# Patient Record
Sex: MALE | Race: WHITE | URBAN - METROPOLITAN AREA
[De-identification: names, ages, dates, MRNs, and addresses within clinical notes are randomized per-mention and may not be internally consistent; named-entity substitution may affect disease eponyms.]

---

## 2019-10-10 ENCOUNTER — APPOINTMENT (OUTPATIENT)
Dept: URBAN - METROPOLITAN AREA CLINIC 232 | Age: 59
Setting detail: DERMATOLOGY
End: 2019-10-11

## 2019-10-10 DIAGNOSIS — L91.8 OTHER HYPERTROPHIC DISORDERS OF THE SKIN: ICD-10-CM

## 2019-10-10 DIAGNOSIS — L663 OTHER SPECIFIED DISEASES OF HAIR AND HAIR FOLLICLES: ICD-10-CM

## 2019-10-10 DIAGNOSIS — L738 OTHER SPECIFIED DISEASES OF HAIR AND HAIR FOLLICLES: ICD-10-CM

## 2019-10-10 DIAGNOSIS — L82.1 OTHER SEBORRHEIC KERATOSIS: ICD-10-CM

## 2019-10-10 PROBLEM — L02.221 FURUNCLE OF ABDOMINAL WALL: Status: ACTIVE | Noted: 2019-10-10

## 2019-10-10 PROCEDURE — 99213 OFFICE O/P EST LOW 20 MIN: CPT | Mod: 25

## 2019-10-10 PROCEDURE — OTHER COUNSELING: OTHER

## 2019-10-10 PROCEDURE — OTHER SKIN TAG REMOVAL: OTHER

## 2019-10-10 PROCEDURE — 11200 RMVL SKIN TAGS UP TO&INC 15: CPT

## 2019-10-10 PROCEDURE — OTHER PRESCRIPTION: OTHER

## 2019-10-10 RX ORDER — CLINDAMYCIN PHOSPHATE 10 MG/ML
LOTION TOPICAL
Qty: 60 | Refills: 3 | Status: ERX | COMMUNITY
Start: 2019-10-10

## 2019-10-10 ASSESSMENT — LOCATION ZONE DERM
LOCATION ZONE: AXILLAE
LOCATION ZONE: TRUNK
LOCATION ZONE: EYELID
LOCATION ZONE: FACE

## 2019-10-10 ASSESSMENT — LOCATION DETAILED DESCRIPTION DERM
LOCATION DETAILED: RIGHT SUPERIOR MEDIAL UPPER BACK
LOCATION DETAILED: LEFT SUPERIOR CENTRAL MALAR CHEEK
LOCATION DETAILED: RIGHT LATERAL EYEBROW
LOCATION DETAILED: RIGHT AXILLARY VAULT
LOCATION DETAILED: LEFT RIB CAGE
LOCATION DETAILED: RIGHT SUPERIOR CENTRAL MALAR CHEEK
LOCATION DETAILED: RIGHT LATERAL ABDOMEN
LOCATION DETAILED: LEFT LATERAL CANTHUS

## 2019-10-10 ASSESSMENT — LOCATION SIMPLE DESCRIPTION DERM
LOCATION SIMPLE: RIGHT EYEBROW
LOCATION SIMPLE: RIGHT AXILLARY VAULT
LOCATION SIMPLE: LEFT CHEEK
LOCATION SIMPLE: RIGHT UPPER BACK
LOCATION SIMPLE: ABDOMEN
LOCATION SIMPLE: RIGHT CHEEK
LOCATION SIMPLE: LEFT EYELID

## 2019-10-10 NOTE — PROCEDURE: SKIN TAG REMOVAL
Include Z78.9 (Other Specified Conditions Influencing Health Status) As An Associated Diagnosis?: No
Medical Necessity Information: It is in your best interest to select a reason for this procedure from the list below. All of these items fulfill various CMS LCD requirements except the new and changing color options.
Add Associated Diagnoses If Applicable When Selecting Medical Necessity: Yes
Consent: Written consent obtained and the risks of skin tag removal was reviewed with the patient including but not limited to bleeding, pigmentary change, infection, pain, and remote possibility of scarring. Pt is also aware that this procedure may not be covered by insurance in which case, it will out of pocket cost. for patient.
Anesthesia Volume In Cc: 0.2
Medical Necessity Clause: This procedure was medically necessary because the lesions that were treated were:
Detail Level: Detailed
Hemostasis: Electrocautery and Aluminum Chloride
Anesthesia Type: 1% lidocaine with epinephrine and a 1:10 solution of 8.4% sodium bicarbonate

## 2019-10-10 NOTE — HPI: RASH
What Type Of Note Output Would You Prefer (Optional)?: Standard Output
How Severe Is Your Rash?: mild
Is This A New Presentation, Or A Follow-Up?: Follow Up Rash
Additional History: Pt states the rash

## 2020-12-30 ENCOUNTER — APPOINTMENT (OUTPATIENT)
Dept: URBAN - METROPOLITAN AREA CLINIC 232 | Age: 60
Setting detail: DERMATOLOGY
End: 2020-12-31

## 2020-12-30 DIAGNOSIS — L30.8 OTHER SPECIFIED DERMATITIS: ICD-10-CM

## 2020-12-30 DIAGNOSIS — L82.1 OTHER SEBORRHEIC KERATOSIS: ICD-10-CM

## 2020-12-30 DIAGNOSIS — D22 MELANOCYTIC NEVI: ICD-10-CM

## 2020-12-30 DIAGNOSIS — L81.4 OTHER MELANIN HYPERPIGMENTATION: ICD-10-CM

## 2020-12-30 DIAGNOSIS — D18.0 HEMANGIOMA: ICD-10-CM

## 2020-12-30 PROBLEM — D18.01 HEMANGIOMA OF SKIN AND SUBCUTANEOUS TISSUE: Status: ACTIVE | Noted: 2020-12-30

## 2020-12-30 PROBLEM — D22.5 MELANOCYTIC NEVI OF TRUNK: Status: ACTIVE | Noted: 2020-12-30

## 2020-12-30 PROCEDURE — OTHER COUNSELING: OTHER

## 2020-12-30 PROCEDURE — OTHER PRESCRIPTION: OTHER

## 2020-12-30 PROCEDURE — 99213 OFFICE O/P EST LOW 20 MIN: CPT

## 2020-12-30 RX ORDER — TRIAMCINOLONE ACETONIDE 1 MG/G
CREAM TOPICAL BID
Qty: 1 | Refills: 1 | Status: ERX | COMMUNITY
Start: 2020-12-30

## 2020-12-30 ASSESSMENT — LOCATION DETAILED DESCRIPTION DERM
LOCATION DETAILED: RIGHT LATERAL ABDOMEN
LOCATION DETAILED: LEFT SUPERIOR MEDIAL MIDBACK
LOCATION DETAILED: LEFT MEDIAL UPPER BACK
LOCATION DETAILED: SUPERIOR THORACIC SPINE
LOCATION DETAILED: LEFT INFERIOR LATERAL UPPER BACK

## 2020-12-30 ASSESSMENT — LOCATION SIMPLE DESCRIPTION DERM
LOCATION SIMPLE: LEFT LOWER BACK
LOCATION SIMPLE: ABDOMEN
LOCATION SIMPLE: UPPER BACK
LOCATION SIMPLE: LEFT UPPER BACK

## 2020-12-30 ASSESSMENT — LOCATION ZONE DERM: LOCATION ZONE: TRUNK
